# Patient Record
Sex: FEMALE | Race: WHITE
[De-identification: names, ages, dates, MRNs, and addresses within clinical notes are randomized per-mention and may not be internally consistent; named-entity substitution may affect disease eponyms.]

---

## 2019-06-24 ENCOUNTER — HOSPITAL ENCOUNTER (EMERGENCY)
Dept: HOSPITAL 33 - ED | Age: 39
Discharge: HOME | End: 2019-06-24
Payer: COMMERCIAL

## 2019-06-24 VITALS — DIASTOLIC BLOOD PRESSURE: 82 MMHG | HEART RATE: 54 BPM | OXYGEN SATURATION: 100 % | SYSTOLIC BLOOD PRESSURE: 127 MMHG

## 2019-06-24 DIAGNOSIS — L25.9: Primary | ICD-10-CM

## 2019-06-24 PROCEDURE — 96372 THER/PROPH/DIAG INJ SC/IM: CPT

## 2019-06-24 PROCEDURE — 99283 EMERGENCY DEPT VISIT LOW MDM: CPT

## 2019-06-24 NOTE — ERPHSYRPT
- History of Present Illness


Time Seen by Provider: 06/24/19 04:00


Source: patient


Exam Limitations: no limitations


Patient Subjective Stated Complaint: pt is alert and oriented. pt is ambulatory 

with a steady gait. pt comes in with c/o swelling to eyes, and redness to face, 

arms and legs accompanied by a rash. pt believes it is poison ivy and states 

she was camping this weekend. pt denies sob, difficulty breathing. no wheezing 

or stridor noted. pt denies sore throat or feeling of tightness. pt lips do not 

appear swollen.


Triage Nursing Assessment: see above


Physician History: 





39 y/o white female with known h/o allergy to poison ivy, was exposed again to 

it 2 days ago while camping. did not see sig sx until this pm when face and 

eyes became puffy and facial swelling. additionally, pt has rash on bilat upper 

ext. pt took benadryl at 2100 last pm. sx not improved. no breathing issues. 


Timing/Duration: day(s) (2)


Severity: moderate


Associated Symptoms: fever, rash, No nausea, No vomiting, No abdominal pain, No 

shortness of breath, No cough


Allergies/Adverse Reactions: 








No Known Drug Allergies Allergy (Unverified 06/24/19 03:59)


 





Immunizations Up to Date: Yes





- Review of Systems


Constitutional: No Symptoms


Eyes: No Symptoms


Ears, Nose, & Throat: No Symptoms


Respiratory: No Symptoms


Cardiac: No Symptoms


Abdominal/Gastrointestinal: No Symptoms


Genitourinary Symptoms: No Symptoms


Musculoskeletal: No Symptoms


Skin: Rash, Other (facial swelling)


Neurological: No Symptoms


Psychological: No Symptoms


Endocrine: No Symptoms


Hematologic/Lymphatic: No Symptoms


Immunological/Allergic: No Symptoms


All Other Systems: Reviewed and Negative





- Past Medical History


Pertinent Past Medical History: No


Neurological History: No Pertinent History


ENT History: No Pertinent History


Cardiac History: No Pertinent History


Respiratory History: No Pertinent History


Endocrine Medical History: No Pertinent History


Musculoskeletal History: No Pertinent History


GI Medical History: No Pertinent History


 History: No Pertinent History


Psycho-Social History: No Pertinent History


Female Reproductive Disorders: No Pertinent History





- Past Surgical History


Past Surgical History: Yes


Neuro Surgical History: No Pertinent History


Cardiac: No Pertinent History


Respiratory: No Pertinent History


Gastrointestinal: No Pertinent History


Genitourinary: No Pertinent History


Musculoskeletal: No Pertinent History


Female Surgical History: Tubal Ligation





- Social History


Smoking Status: Never smoker


Drug Use: none





- Female History


Hx Pregnant Now: No (tubal)





- Nursing Vital Signs


Nursing Vital Signs: 


 Initial Vital Signs











Temperature  98.3 F   06/24/19 03:52


 


Pulse Rate  54 L  06/24/19 03:52


 


Respiratory Rate  16   06/24/19 03:52


 


Blood Pressure  127/82   06/24/19 03:52


 


O2 Sat by Pulse Oximetry  98   06/24/19 03:52








 Pain Scale











Pain Intensity                 0

















- Physical Exam


General Appearance: no apparent distress, alert, anxiety


Eye Exam: PERRL/EOMI, eyes nml inspection


Ears, Nose, Throat Exam: normal ENT inspection, moist mucous membranes


Neck Exam: normal inspection, non-tender, supple, full range of motion


Respiratory Exam: normal breath sounds, lungs clear, airway intact, No chest 

tenderness, No respiratory distress, No wheezing


Cardiovascular Exam: regular rate/rhythm, normal heart sounds, normal 

peripheral pulses


Gastrointestinal/Abdomen Exam: soft, normal bowel sounds, No tenderness


Pelvic Exam: not done


Rectal Exam: not done


Back Exam: normal inspection, normal range of motion, No CVA tenderness, No 

vertebral tenderness


Extremity Exam: normal inspection, normal range of motion, pelvis stable


Neurologic Exam: alert, oriented x 3, cooperative, CNs II-XII nml as tested


Skin Exam: rash (generalized raised pink)


Lymphatic Exam: No adenopathy


**SpO2 Interpretation**: normal


SpO2: 100


O2 Delivery: Room Air


Ordered Tests: 


Medication Summary














Discontinued Medications














Generic Name Dose Route Start Last Admin





  Trade Name Freq  PRN Reason Stop Dose Admin


 


Diphenhydramine HCl  50 mg  06/24/19 04:11  





  Benadryl 25 Mg Capsule***  PO  06/24/19 04:12  





  STAT ONE   





     





     





     





     


 


Famotidine  40 mg  06/24/19 04:12  





  Pepcid 20 Mg***  PO  06/24/19 04:13  





  STAT ONE   





     





     





     





     


 


Methylprednisolone Sodium Succinate  125 mg  06/24/19 04:12  





  Solu-Medrol 125 Mg***  IM  06/24/19 04:13  





  STAT ONE   





     





     





     





     














- Progress


Progress: unchanged


Counseled pt/family regarding: diagnosis, need for follow-up





- Departure


Departure Disposition: Home


Clinical Impression: 


 Contact dermatitis





Condition: Stable


Critical Care Time: No


Referrals: 


AUSTIN MADSEN [Primary Care Provider] - 


Additional Instructions: 


keep skin clean daily. use over the counter benadryl and pepcid or zantac as 

discussed for 4 days. return to ED if symptoms worsen. 


Prescriptions: 


Prednisone 10 mg*** [Deltasone 10 mg***] 10 mg PO TID #12 tablet